# Patient Record
Sex: FEMALE | Race: WHITE | NOT HISPANIC OR LATINO | URBAN - METROPOLITAN AREA
[De-identification: names, ages, dates, MRNs, and addresses within clinical notes are randomized per-mention and may not be internally consistent; named-entity substitution may affect disease eponyms.]

---

## 2017-07-14 ENCOUNTER — EMERGENCY (EMERGENCY)
Facility: HOSPITAL | Age: 22
LOS: 1 days | Discharge: HOME | End: 2017-07-14
Admitting: EMERGENCY MEDICINE
Payer: COMMERCIAL

## 2017-07-14 VITALS
HEART RATE: 84 BPM | OXYGEN SATURATION: 99 % | RESPIRATION RATE: 18 BRPM | DIASTOLIC BLOOD PRESSURE: 74 MMHG | TEMPERATURE: 98 F | SYSTOLIC BLOOD PRESSURE: 116 MMHG

## 2017-07-14 DIAGNOSIS — B85.2 PEDICULOSIS, UNSPECIFIED: ICD-10-CM

## 2017-07-14 PROCEDURE — 99282 EMERGENCY DEPT VISIT SF MDM: CPT

## 2017-07-14 RX ORDER — PERMETHRIN CREAM 5% W/W 50 MG/G
1 CREAM TOPICAL ONCE
Qty: 0 | Refills: 0 | Status: COMPLETED | OUTPATIENT
Start: 2017-07-14 | End: 2017-07-14

## 2017-07-14 NOTE — ED PROVIDER NOTE - OBJECTIVE STATEMENT
Outpatient Physical Therapy Women's Health Progress Note  [facility]     Patient Name: Yadira Wallace  : 1985  MRN: [id number]  Today's Date: 3/22/2017        Visit Date: 2017    Visit Dx:    ICD-10-CM ICD-9-CM   1. Pelvic pain R10.2 [id number]   2. Pelvic floor dysfunction N81.84 618.83       Patient Active Problem List   Diagnosis   • History of migraine headaches   • ETD (eustachian tube dysfunction)                               PT Assessment/Plan       17 1555       PT Assessment    Functional Limitations Impaired gait;Limitation in home management;Limitations in community activities;Performance in leisure activities  -KR     Impairments Balance;Gait;Joint mobility;Pain;Muscle strength;Posture;Range of motion  -KR     Assessment Comments Two goals met. She is progressing well towards all goals. She is still having weakness in her core and hips increasing stress and mobiltiy of her pelvis causing pain with increased activity.   -KR     Please refer to paper survey for additional self-reported information Yes  -KR     Rehab Potential Excellent  -KR     Patient/caregiver participated in establishment of treatment plan and goals Yes  -KR     Patient would benefit from skilled therapy intervention Yes  -KR     PT Plan    PT Frequency --   1-4 times per month  -KR     Predicted Duration of Therapy Intervention (days/wks) 2-3 months  -KR     Planned CPT's? PT THER PROC EA 15 MIN: 83659;PT THER ACT EA 15 MIN: 79542;PT MANUAL THERAPY EA 15 MIN: 40769;PT NEUROMUSC RE-EDUCATION EA 15 MIN: 42376;PT GAIT TRAINING EA 15 MIN: 02810;PT ELECTRICAL STIM UNATTEND: ;PT ELECTRICAL STIM ATTD EA 15 MIN: 14963  -KR     PT Plan Comments Assess pelvic floor next visit then proceed with self massage vs contrinue with stretching. Increase gym lik activities, staying pain free   -KR       User Key  (r) = Recorded By, (t) = Taken By, (c) = Cosigned By    Initials Name Provider Type    CHUCK Villafuerte, PT DPT  Physical Therapist                      Exercises       03/22/17 1555          Subjective Comments    Subjective Comments She reports pelvic floor activity made her sore. She reports pain doesn't last as long.   -KR      Subjective Pain    Able to rate subjective pain? yes  -KR      Pre-Treatment Pain Level 0  -KR      Post-Treatment Pain Level 0  -KR      Exercise 1    Exercise Name 1 hooklying lateral/inferior belt distraactions   -KR      Exercise 2    Exercise Name 2 figure 4 stretch  -KR      Sets 2 3  -KR      Time (Seconds) 2 30  -KR      Exercise 3    Exercise Name 3 wall squat with green TB around knees  -KR      Sets 3 2  -KR      Reps 3 10  -KR      Exercise 4    Exercise Name 4 educated on positions to avoid, increasing stress on pelvis, home exercises, progression  -KR      Exercise 5    Exercise Name 5 clam with green TB   -KR      Reps 5 15  -KR        User Key  (r) = Recorded By, (t) = Taken By, (c) = Cosigned By    Initials Name Provider Type    CHUCK Villafuerte, PT DPT Physical Therapist                            PT OP Goals       03/22/17 1555       PT Short Term Goals    STG Date to Achieve 04/26/17  -KR     STG 1 Pt will report pain no greater than 3-4/10 with activity  -KR     STG 1 Progress Ongoing  -KR     STG 1 Progress Comments 5/10,  but decreased time frame  -KR     STG 2 Pt will be independent with initial HEP.   -KR     STG 2 Progress Met  -KR     STG 3 Pt will have aligned pelvis for 2 visits.   -KR     STG 3 Progress Met  -KR     Long Term Goals    LTG Date to Achieve 06/07/17  -KR     LTG 1 Pt will be able to return to gym activity without increase in pain.   -KR     LTG 1 Progress Ongoing  -KR     LTG 1 Progress Comments working some at home, but hasn't return to gym or running  -KR     LTG 2 Pt will have 4/5 or greater hip abduction.   -KR     LTG 2 Progress Ongoing  -KR     LTG 2 Progress Comments added to HEP to work on, still having medial knee dive  -KR     LTG 3 Pt will  perform SLS for 30 seconds or greater without compensation.   -KR     LTG 3 Progress Ongoing  -KR     LTG 3 Progress Comments hip dropo and medial knee dive with SLS   -KR     LTG 4 Pt perform step down with minimal medial knee dive or hip drop.   -KR     LTG 4 Progress Ongoing  -KR     LTG 4 Progress Comments still having medial knee dive  -KR     Time Calculation    PT Goal Re-Cert Due Date 04/21/17  -KR       User Key  (r) = Recorded By, (t) = Taken By, (c) = Cosigned By    Initials Name Provider Type    CHUCK Villafuerte PT DPT Physical Therapist                 Therapy Education       03/22/17 1555          Therapy Education    Given HEP;Symptoms/condition management;Posture/body mechanics  -KR      Program Progressed  -KR      How Provided Verbal;Demonstration;Written  -KR      Provided to Patient  -KR      Level of Understanding Verbalized;Demonstrated  -KR        User Key  (r) = Recorded By, (t) = Taken By, (c) = Cosigned By    Initials Name Provider Type    CHUCK Villafuerte PT DPT Physical Therapist                Time Calculation:   Start Time: 1555  Stop Time: 1634  Time Calculation (min): 39 min  Total Timed Code Minutes- PT: 39 minute(s)    Therapy Charges for Today     Code Description Service Date Service Provider Modifiers Qty    01353210789 HC PT THER PROC EA 15 MIN 3/22/2017 Nena Villafuerte PT DPT GP 3                    Nena Villafuerte PT DPT  3/22/2017      21 yo female presents stating she thinks she has lice, states she was pulling the lice from her hair today with itchiness to scalp. Lives with grandma and boyfriend at this time.

## 2017-07-14 NOTE — ED PROVIDER NOTE - MEDICAL DECISION MAKING DETAILS
will give permethrin rinse and cream, advised to comb out hair, wash all clothing and linens in hot water, discussed contact precautions
